# Patient Record
Sex: MALE | Race: WHITE | NOT HISPANIC OR LATINO | Employment: FULL TIME | URBAN - METROPOLITAN AREA
[De-identification: names, ages, dates, MRNs, and addresses within clinical notes are randomized per-mention and may not be internally consistent; named-entity substitution may affect disease eponyms.]

---

## 2023-02-13 DIAGNOSIS — Q66.50 CONGENITAL PES PLANUS, UNSPECIFIED LATERALITY: Primary | ICD-10-CM

## 2023-02-16 ENCOUNTER — OFFICE VISIT (OUTPATIENT)
Dept: PODIATRY | Facility: CLINIC | Age: 38
End: 2023-02-16
Payer: COMMERCIAL

## 2023-02-16 DIAGNOSIS — E66.01 MORBID OBESITY: ICD-10-CM

## 2023-02-16 DIAGNOSIS — S93.412S SPRAIN OF CALCANEOFIBULAR LIGAMENT OF LEFT ANKLE, SEQUELA: ICD-10-CM

## 2023-02-16 DIAGNOSIS — S93.492S SPRAIN OF ANTERIOR TALOFIBULAR LIGAMENT OF LEFT ANKLE, SEQUELA: ICD-10-CM

## 2023-02-16 DIAGNOSIS — M25.572 PAIN IN JOINT INVOLVING LEFT ANKLE AND FOOT: ICD-10-CM

## 2023-02-16 DIAGNOSIS — M95.8 OSTEOCHONDRAL DEFECT OF TALUS: Primary | ICD-10-CM

## 2023-02-16 DIAGNOSIS — S93.432S ANKLE SYNDESMOSIS DISRUPTION, LEFT, SEQUELA: ICD-10-CM

## 2023-02-16 PROCEDURE — 99204 PR OFFICE/OUTPT VISIT, NEW, LEVL IV, 45-59 MIN: ICD-10-PCS | Mod: S$GLB,,, | Performed by: PODIATRIST

## 2023-02-16 PROCEDURE — 99204 OFFICE O/P NEW MOD 45 MIN: CPT | Mod: S$GLB,,, | Performed by: PODIATRIST

## 2023-02-16 PROCEDURE — 99999 PR PBB SHADOW E&M-EST. PATIENT-LVL II: CPT | Mod: PBBFAC,,, | Performed by: PODIATRIST

## 2023-02-16 PROCEDURE — 99999 PR PBB SHADOW E&M-EST. PATIENT-LVL II: ICD-10-PCS | Mod: PBBFAC,,, | Performed by: PODIATRIST

## 2023-02-16 RX ORDER — DICLOFENAC SODIUM 75 MG/1
75 TABLET, DELAYED RELEASE ORAL 2 TIMES DAILY
Qty: 60 TABLET | Refills: 1 | Status: SHIPPED | OUTPATIENT
Start: 2023-02-16 | End: 2023-03-18

## 2023-02-16 NOTE — PROGRESS NOTES
Subjective:       Patient ID: Claudio Byrd is a 37 y.o. male.    Chief Complaint: Foot Pain and Ankle Pain      HPI: Claudio Byrd presents to the clinic today with the complaint of moderate pains to the right ankle, anterior and medial aspects and lateral aspect. Patient does not state recent trauma, but does have a history of recurrent inversion sprains. Pains are rated at approx. 7/10. Pains are described as sharp and achy in nature. Walking, standing and prolonged weight bearing activities do exacerbate the symptoms. Patient states prior radiographic evaluation (XR and MRI) by a MD//DPM in [place]. States likely OCD lesion. The patient has been evaluated prior by a medical profession. NSAIDs have not been taken for the symptomology. Pains have been present several months. Wife is present.       Review of patient's allergies indicates:  No Known Allergies    No past medical history on file.    No family history on file.    Social History     Socioeconomic History    Marital status:        No past surgical history on file.    Review of Systems   Constitutional:  Negative for chills, fatigue and fever.   HENT:  Negative for hearing loss.    Eyes:  Negative for photophobia and visual disturbance.   Respiratory:  Negative for cough, chest tightness, shortness of breath and wheezing.    Cardiovascular:  Negative for chest pain and palpitations.   Gastrointestinal:  Negative for constipation, diarrhea, nausea and vomiting.   Endocrine: Negative for cold intolerance and heat intolerance.   Genitourinary:  Negative for flank pain.   Musculoskeletal:  Positive for gait problem. Negative for neck pain and neck stiffness.   Neurological:  Negative for light-headedness and headaches.   Psychiatric/Behavioral:  Negative for sleep disturbance.        Objective:   There were no vitals taken for this visit.    No image results found.      Physical Exam  LOWER EXTREMITY PHYSICAL  EXAMINATION    DERMATOLOGY: Skin is supple, dry and intact.     ORTHOPEDIC: There is no tenderness to palpation of the medial malleolus. There is severe tenderness to palpation of the lateral malleolus. There is moderate tenderness to palpation of the ATFL. There is moderate pain to palpation of the CFL. There is mild pain to palpation of the PFL. There is mild tenderness to palpation of the deltoid ligament complex. Compression of the tibia and fibula at the mid-calf does not produce pains at the distal ankle articulation to suggestion high ankle sprain. There is no tenderness to palpation of the 5th met. base. There is no tenderness to palpation of the navicular tuberosity. There is mild to palpation along the course of the PB and PL tendons. There is mild edema noted to this area. There is mild retro-malleolar edema.  No subluxing peroneals are noted. There is no tenderness to palpation of the course of the posterior tibial tendon. There is no edema noted along its course. There is moderate tenderness to palpation of the anterior lateral ankle gutter. There is moderate tenderness to palpation of the anterior medial ankle gutter. Anterior Drawer Testing is WNL. Stress valgus and varus ankle testing is WNL. There is no pain to the area of the sinus tarsi. Pes Planus  foot type is noted. No palpable foot or ankle fractures are noted. MMT is painful with inversion and eversion with and without resistance and decreased as compared to the contra-lateral. MMT is decreased on the right as compared to the contra-lateral. Patient is able to bear weight. Gait pattern is antalgic.      Assessment:     1. Osteochondral defect of talus    2. Pain in joint involving left ankle and foot    3. Sprain of anterior talofibular ligament of left ankle, sequela    4. Sprain of calcaneofibular ligament of left ankle, sequela    5. Ankle syndesmosis disruption, left, sequela    6. Morbid obesity          Plan:     Osteochondral defect of  talus  -     Ambulatory referral/consult to Podiatry    Pain in joint involving left ankle and foot  -     diclofenac (VOLTAREN) 75 MG EC tablet; Take 1 tablet (75 mg total) by mouth 2 (two) times daily.  Dispense: 60 tablet; Refill: 1    Sprain of anterior talofibular ligament of left ankle, sequela    Sprain of calcaneofibular ligament of left ankle, sequela    Ankle syndesmosis disruption, left, sequela    Morbid obesity      Thorough discussion is had with the patient today, concerning the diagnosis, its etiology, and the treatment algorithm at present.     Likely OCD with ATFL and CFL and AITFL pathology given BMI and Hx of inversion sprains w/o PT/OT.    Patient is counseled and reminded concerning the importance of good nutrition and healthy eating habits, which may include blood sugar control, to prevent and/or help podiatric foot and ankle complications.    CSI vs PO NSAIDs for now.    Will need to repeat MRI and obtain CT for pre-op when the time comes.    Lateral ankle complex is severely loosened due to recurrent injury. Given his BMI and age, Sx is best course of action.    The procedure of (repair of OCD lesion with ATFL +/- CFL repair with AITFL repair) was thoroughly explained to the patient. Its necessity and/or purpose and the implications therein were outlined, including any pertinent advantages and/or disadvantages, and possible complications, if any. Possible complications include recurrence of pathology and/or deformity, infection (cellulitis, drainage, purulence, malodor, etc...), pain, numbness, neuritis, edema, burning, loss of function, need for further surgery, possible need for removal of any implanted hardware, soft tissue contracture and/or scarring, etc... No guarantees were given and/or implied. Post-operative expectations and weightbearing protocol is thoroughly explained to the patient, who acknowledges understanding.         Future Appointments   Date Time Provider Department  [place]   2/27/2023  4:00 PM Siddharth Son MD [facility]

## 2024-08-31 ENCOUNTER — HOSPITAL ENCOUNTER (EMERGENCY)
Facility: HOSPITAL | Age: 39
Discharge: HOME | End: 2024-09-01
Attending: EMERGENCY MEDICINE
Payer: COMMERCIAL

## 2024-08-31 DIAGNOSIS — N45.1 RIGHT EPIDIDYMITIS: Primary | ICD-10-CM

## 2024-08-31 DIAGNOSIS — K76.0 FATTY LIVER: ICD-10-CM

## 2024-08-31 DIAGNOSIS — N43.3 RIGHT HYDROCELE: ICD-10-CM

## 2024-08-31 DIAGNOSIS — N45.2 ORCHITIS OF RIGHT TESTICLE: ICD-10-CM

## 2024-08-31 DIAGNOSIS — R50.9 FEVER: ICD-10-CM

## 2024-08-31 DIAGNOSIS — N20.0 KIDNEY STONE ON LEFT SIDE: ICD-10-CM

## 2024-08-31 DIAGNOSIS — N50.819 TESTICULAR PAIN: ICD-10-CM

## 2024-08-31 DIAGNOSIS — N30.90 CYSTITIS: ICD-10-CM

## 2024-08-31 LAB
ALBUMIN SERPL BCP-MCNC: 3.2 G/DL (ref 3.5–5.2)
ALP SERPL-CCNC: 57 U/L (ref 55–135)
ALT SERPL W/O P-5'-P-CCNC: 52 U/L (ref 10–44)
ANION GAP SERPL CALC-SCNC: 14 MMOL/L (ref 8–16)
AST SERPL-CCNC: 30 U/L (ref 10–40)
BILIRUB SERPL-MCNC: 0.5 MG/DL (ref 0.1–1)
BUN SERPL-MCNC: 17 MG/DL (ref 6–20)
CALCIUM SERPL-MCNC: 9.6 MG/DL (ref 8.7–10.5)
CHLORIDE SERPL-SCNC: 105 MMOL/L (ref 95–110)
CO2 SERPL-SCNC: 19 MMOL/L (ref 23–29)
CREAT SERPL-MCNC: 1.2 MG/DL (ref 0.5–1.4)
EST. GFR  (NO RACE VARIABLE): >60 ML/MIN/1.73 M^2
GLUCOSE SERPL-MCNC: 95 MG/DL (ref 70–110)
LACTATE SERPL-SCNC: 1.4 MMOL/L (ref 0.5–2.2)
MAGNESIUM SERPL-MCNC: 1.8 MG/DL (ref 1.6–2.6)
POTASSIUM SERPL-SCNC: 3.9 MMOL/L (ref 3.5–5.1)
PROT SERPL-MCNC: 7.1 G/DL (ref 6–8.4)
SARS-COV-2 RDRP RESP QL NAA+PROBE: NEGATIVE
SODIUM SERPL-SCNC: 138 MMOL/L (ref 136–145)
TROPONIN I SERPL DL<=0.01 NG/ML-MCNC: <0.006 NG/ML (ref 0–0.03)

## 2024-08-31 PROCEDURE — 85007 BL SMEAR W/DIFF WBC COUNT: CPT | Performed by: EMERGENCY MEDICINE

## 2024-08-31 PROCEDURE — 93005 ELECTROCARDIOGRAM TRACING: CPT

## 2024-08-31 PROCEDURE — U0002 COVID-19 LAB TEST NON-CDC: HCPCS | Performed by: EMERGENCY MEDICINE

## 2024-08-31 PROCEDURE — 93010 ELECTROCARDIOGRAM REPORT: CPT | Mod: ,,, | Performed by: INTERNAL MEDICINE

## 2024-08-31 PROCEDURE — 84484 ASSAY OF TROPONIN QUANT: CPT | Performed by: EMERGENCY MEDICINE

## 2024-08-31 PROCEDURE — 80053 COMPREHEN METABOLIC PANEL: CPT | Performed by: EMERGENCY MEDICINE

## 2024-08-31 PROCEDURE — 83735 ASSAY OF MAGNESIUM: CPT | Performed by: EMERGENCY MEDICINE

## 2024-08-31 PROCEDURE — 99285 EMERGENCY DEPT VISIT HI MDM: CPT | Mod: 25

## 2024-08-31 PROCEDURE — 84145 PROCALCITONIN (PCT): CPT | Performed by: EMERGENCY MEDICINE

## 2024-08-31 PROCEDURE — 83605 ASSAY OF LACTIC ACID: CPT | Performed by: EMERGENCY MEDICINE

## 2024-08-31 PROCEDURE — 85027 COMPLETE CBC AUTOMATED: CPT | Performed by: EMERGENCY MEDICINE

## 2024-08-31 PROCEDURE — 87040 BLOOD CULTURE FOR BACTERIA: CPT | Mod: 59 | Performed by: EMERGENCY MEDICINE

## 2024-09-01 VITALS
TEMPERATURE: 98 F | OXYGEN SATURATION: 99 % | HEART RATE: 83 BPM | DIASTOLIC BLOOD PRESSURE: 92 MMHG | RESPIRATION RATE: 20 BRPM | SYSTOLIC BLOOD PRESSURE: 138 MMHG | BODY MASS INDEX: 41.67 KG/M2 | WEIGHT: 297.63 LBS | HEIGHT: 71 IN

## 2024-09-01 LAB
BACTERIA #/AREA URNS HPF: NORMAL /HPF
BASOPHILS # BLD AUTO: ABNORMAL K/UL (ref 0–0.2)
BASOPHILS NFR BLD: 0 % (ref 0–1.9)
BILIRUB UR QL STRIP: NEGATIVE
CLARITY UR: CLEAR
COLOR UR: YELLOW
DIFFERENTIAL METHOD BLD: ABNORMAL
EOSINOPHIL # BLD AUTO: ABNORMAL K/UL (ref 0–0.5)
EOSINOPHIL NFR BLD: 1 % (ref 0–8)
ERYTHROCYTE [DISTWIDTH] IN BLOOD BY AUTOMATED COUNT: 11.9 % (ref 11.5–14.5)
GLUCOSE UR QL STRIP: NEGATIVE
HCT VFR BLD AUTO: 45.6 % (ref 40–54)
HETEROPH AB SERPL QL IA: NEGATIVE
HGB BLD-MCNC: 15.6 G/DL (ref 14–18)
HGB UR QL STRIP: ABNORMAL
HYALINE CASTS #/AREA URNS LPF: 0 /LPF
IMM GRANULOCYTES # BLD AUTO: ABNORMAL K/UL (ref 0–0.04)
IMM GRANULOCYTES NFR BLD AUTO: ABNORMAL % (ref 0–0.5)
KETONES UR QL STRIP: NEGATIVE
LACTATE SERPL-SCNC: 1 MMOL/L (ref 0.5–2.2)
LEUKOCYTE ESTERASE UR QL STRIP: NEGATIVE
LYMPHOCYTES # BLD AUTO: ABNORMAL K/UL (ref 1–4.8)
LYMPHOCYTES NFR BLD: 18 % (ref 18–48)
MCH RBC QN AUTO: 29.8 PG (ref 27–31)
MCHC RBC AUTO-ENTMCNC: 34.2 G/DL (ref 32–36)
MCV RBC AUTO: 87 FL (ref 82–98)
MICROSCOPIC COMMENT: NORMAL
MONOCYTES # BLD AUTO: ABNORMAL K/UL (ref 0.3–1)
MONOCYTES NFR BLD: 10 % (ref 4–15)
MYELOCYTES NFR BLD MANUAL: 3 %
NEUTROPHILS NFR BLD: 68 % (ref 38–73)
NITRITE UR QL STRIP: NEGATIVE
NRBC BLD-RTO: 0 /100 WBC
OHS QRS DURATION: 96 MS
OHS QTC CALCULATION: 426 MS
PH UR STRIP: 6 [PH] (ref 5–8)
PLATELET # BLD AUTO: 251 K/UL (ref 150–450)
PLATELET BLD QL SMEAR: ABNORMAL
PMV BLD AUTO: 8.6 FL (ref 9.2–12.9)
PROCALCITONIN SERPL IA-MCNC: 0.06 NG/ML
PROT UR QL STRIP: ABNORMAL
RBC # BLD AUTO: 5.24 M/UL (ref 4.6–6.2)
RBC #/AREA URNS HPF: 0 /HPF (ref 0–4)
SP GR UR STRIP: 1.03 (ref 1–1.03)
URN SPEC COLLECT METH UR: ABNORMAL
UROBILINOGEN UR STRIP-ACNC: NEGATIVE EU/DL
WBC # BLD AUTO: 10.44 K/UL (ref 3.9–12.7)
WBC #/AREA URNS HPF: 0 /HPF (ref 0–5)

## 2024-09-01 PROCEDURE — 87491 CHLMYD TRACH DNA AMP PROBE: CPT | Performed by: EMERGENCY MEDICINE

## 2024-09-01 PROCEDURE — 25000003 PHARM REV CODE 250: Performed by: EMERGENCY MEDICINE

## 2024-09-01 PROCEDURE — 96365 THER/PROPH/DIAG IV INF INIT: CPT | Mod: 59

## 2024-09-01 PROCEDURE — 81000 URINALYSIS NONAUTO W/SCOPE: CPT | Performed by: EMERGENCY MEDICINE

## 2024-09-01 PROCEDURE — 25500020 PHARM REV CODE 255: Performed by: EMERGENCY MEDICINE

## 2024-09-01 PROCEDURE — 63600175 PHARM REV CODE 636 W HCPCS: Performed by: EMERGENCY MEDICINE

## 2024-09-01 PROCEDURE — 83605 ASSAY OF LACTIC ACID: CPT | Performed by: EMERGENCY MEDICINE

## 2024-09-01 PROCEDURE — 63700000 PHARM REV CODE 250 ALT 637 W/O HCPCS: Performed by: EMERGENCY MEDICINE

## 2024-09-01 PROCEDURE — 96367 TX/PROPH/DG ADDL SEQ IV INF: CPT

## 2024-09-01 PROCEDURE — 86308 HETEROPHILE ANTIBODY SCREEN: CPT | Performed by: EMERGENCY MEDICINE

## 2024-09-01 PROCEDURE — 87591 N.GONORRHOEAE DNA AMP PROB: CPT | Performed by: EMERGENCY MEDICINE

## 2024-09-01 RX ORDER — LEVOFLOXACIN 5 MG/ML
500 INJECTION, SOLUTION INTRAVENOUS ONCE
Status: COMPLETED | OUTPATIENT
Start: 2024-09-01 | End: 2024-09-01

## 2024-09-01 RX ORDER — AZITHROMYCIN 250 MG/1
1000 TABLET, FILM COATED ORAL
Status: COMPLETED | OUTPATIENT
Start: 2024-09-01 | End: 2024-09-01

## 2024-09-01 RX ORDER — LEVOFLOXACIN 500 MG/1
500 TABLET, FILM COATED ORAL DAILY
Qty: 10 TABLET | Refills: 0 | Status: SHIPPED | OUTPATIENT
Start: 2024-09-01 | End: 2024-09-11

## 2024-09-01 RX ORDER — KETOROLAC TROMETHAMINE 10 MG/1
10 TABLET, FILM COATED ORAL EVERY 6 HOURS
Qty: 20 TABLET | Refills: 0 | Status: SHIPPED | OUTPATIENT
Start: 2024-09-01 | End: 2024-09-06

## 2024-09-01 RX ORDER — HYDROCODONE BITARTRATE AND ACETAMINOPHEN 5; 325 MG/1; MG/1
1 TABLET ORAL EVERY 4 HOURS PRN
Qty: 12 TABLET | Refills: 0 | Status: SHIPPED | OUTPATIENT
Start: 2024-09-01

## 2024-09-01 RX ADMIN — IOHEXOL 100 ML: 350 INJECTION, SOLUTION INTRAVENOUS at 12:09

## 2024-09-01 RX ADMIN — LEVOFLOXACIN 500 MG: 500 INJECTION, SOLUTION INTRAVENOUS at 02:09

## 2024-09-01 RX ADMIN — AZITHROMYCIN DIHYDRATE 1000 MG: 250 TABLET ORAL at 01:09

## 2024-09-01 RX ADMIN — CEFTRIAXONE 1 G: 1 INJECTION, POWDER, FOR SOLUTION INTRAMUSCULAR; INTRAVENOUS at 01:09

## 2024-09-01 RX ADMIN — SODIUM CHLORIDE 1000 ML: 9 INJECTION, SOLUTION INTRAVENOUS at 02:09

## 2024-09-01 NOTE — ED PROVIDER NOTES
SCRIBE #1 NOTE: IZeenat, am scribing for, and in the presence of, Yolanda Villar DO. I have scribed the HPI, ROS, and PEx.     SCRIBE #2 NOTE: I, Judy Arellano, am scribing for, and in the presence of,  Leonard Kraus MD. I have scribed the remaining portions of the note not scribed by Scribe #1.      History     Chief Complaint   Patient presents with    Groin Pain     Pt reports swelling, pain, and tenderness to left testicle 2 days ago that resolved, then right testicle started swelling with pain and tenderness yesterday. Fever at home up to 103 PTA.  Last oxycodone and ibuprofen 1.5 hours PTA. Pt denies burning, pain, and difficulty urinating.      Review of patient's allergies indicates:  No Known Allergies      History of Present Illness     HPI    8/31/2024, 10:29 PM  History obtained from the patient      History of Present Illness: Claudio Byrd is a 38 y.o. male patient who presents to the Emergency Department for evaluation of testicular pain which onset 3 days ago. Pt reports he was experiencing left testicle swelling and pain 3 days ago. Those sxs were resolved. Yesterday, pt reports his right testicle swelling and pain. Symptoms are constant and moderate in severity. No mitigating or exacerbating factors reported. Associated sxs include 103 fever and diaphoresis.  He says the pain radiates to his abdomen.  Patient denies any N/V, diarrhea, constipation, flank pain, dysuria, hematuria, frequency, difficulty urinating, and all other sxs at this time.  Patient reports recently being diagnosed with hand, foot, and mouth disease.  Prior Tx includes ibuprofen and oxycodone. No further complaints or concerns at this time.       Arrival mode: Personal vehicle      PCP: Bridgett, Primary Doctor        Past Medical History:  History reviewed. No pertinent past medical history.    Past Surgical History:  History reviewed. No pertinent surgical history.      Family History:  No family history  on file.    Social History:  Social History     Tobacco Use    Smoking status: Never    Smokeless tobacco: Not on file   Substance and Sexual Activity    Alcohol use: Not on file    Drug use: Not on file    Sexual activity: Not on file        Review of Systems     Review of Systems   Constitutional:  Positive for diaphoresis and fever.   Gastrointestinal:  Positive for abdominal pain. Negative for constipation, diarrhea, nausea and vomiting.   Genitourinary:  Positive for testicular pain. Negative for difficulty urinating, dysuria, flank pain, frequency and hematuria.        (+) Testicular swelling        Physical Exam     Initial Vitals [08/31/24 2157]   BP Pulse Resp Temp SpO2   (!) 151/77 99 16 100.3 °F (37.9 °C) 97 %      MAP       --          Physical Exam  Nursing Notes and Vital Signs Reviewed.  Constitutional: Patient is in no acute distress. Well-developed and well-nourished. Diaphoretic.  Head: Atraumatic. Normocephalic.  Eyes: PERRL. EOM intact. Conjunctivae are not pale. No scleral icterus.  ENT: Mucous membranes are moist. Oropharynx is clear and symmetric.    Neck: Supple. Full ROM. No lymphadenopathy.  Cardiovascular: Tachycardic. Regular rhythm. No murmurs, rubs, or gallops. Distal pulses are 2+ and symmetric.  Pulmonary/Chest: No respiratory distress. Clear to auscultation bilaterally. No wheezing or rales.  Abdominal: Soft and non-distended.  There is no tenderness.  No rebound, guarding, or rigidity. Good bowel sounds.  : Male chaperone was present. No CVA tenderness. External inspection is normal. Cremasteric reflexes are intact bilaterally. No erythema, rash, or lesions. No penile discharge. Normal bilateral testicular lie and position. Scrotum and testes appear normal with no discoloration. Right epididymal tenderness. No masses or hernias around the scrotum, testicles, or inguinal canal.   Musculoskeletal: Moves all extremities. No obvious deformities. No edema. No calf tenderness.  Skin:  "Warm and dry.  Neurological:  Alert, awake, and appropriate.  Normal speech.  No acute focal neurological deficits are appreciated.  Psychiatric: Normal affect. Good eye contact. Appropriate in content.     ED Course   Procedures  ED Vital Signs:  Vitals:    08/31/24 2157 08/31/24 2225 08/31/24 2230 08/31/24 2300   BP: (!) 151/77  (!) 141/89 (!) 133/91   Pulse: 99 85 91 81   Resp: 16  19 18   Temp: 100.3 °F (37.9 °C)  99.5 °F (37.5 °C)    TempSrc: Oral  Oral    SpO2: 97%  97% 96%   Weight: 135 kg (297 lb 9.9 oz)      Height: 5' 11" (1.803 m)       08/31/24 2330 09/01/24 0000 09/01/24 0030 09/01/24 0100   BP: 128/89 129/85 131/73 (!) 149/84   Pulse: 89 79 79 86   Resp: 17 18 18 17   Temp:       TempSrc:       SpO2: 97% 98% 98% 97%   Weight:       Height:        09/01/24 0130 09/01/24 0200 09/01/24 0230 09/01/24 0300   BP: (!) 145/87 130/84 124/88 (!) 137/94   Pulse: 79 75 73 80   Resp: 16 16 16 17   Temp: 98.4 °F (36.9 °C)      TempSrc: Oral      SpO2: 99% 98% 96% 100%   Weight:       Height:        09/01/24 0330 09/01/24 0353   BP: (!) 140/90 (!) 138/92   Pulse: 79 83   Resp: 15 20   Temp:  98.3 °F (36.8 °C)   TempSrc:     SpO2: 97% 99%   Weight:     Height:         Abnormal Lab Results:  Labs Reviewed   CBC W/ AUTO DIFFERENTIAL - Abnormal       Result Value    WBC 10.44      RBC 5.24      Hemoglobin 15.6      Hematocrit 45.6      MCV 87      MCH 29.8      MCHC 34.2      RDW 11.9      Platelets 251      MPV 8.6 (*)     Immature Granulocytes CANCELED      Immature Grans (Abs) CANCELED      Lymph # CANCELED      Mono # CANCELED      Eos # CANCELED      Baso # CANCELED      nRBC 0      Gran % 68.0      Lymph % 18.0      Mono % 10.0      Eosinophil % 1.0      Basophil % 0.0      Myelocytes 3.0      Platelet Estimate Appears normal      Differential Method Manual     COMPREHENSIVE METABOLIC PANEL - Abnormal    Sodium 138      Potassium 3.9      Chloride 105      CO2 19 (*)     Glucose 95      BUN 17      Creatinine 1.2   "    Calcium 9.6      Total Protein 7.1      Albumin 3.2 (*)     Total Bilirubin 0.5      Alkaline Phosphatase 57      AST 30      ALT 52 (*)     eGFR >60      Anion Gap 14     URINALYSIS, REFLEX TO URINE CULTURE - Abnormal    Specimen UA Urine, Clean Catch      Color, UA Yellow      Appearance, UA Clear      pH, UA 6.0      Specific Gravity, UA 1.030      Protein, UA 2+ (*)     Glucose, UA Negative      Ketones, UA Negative      Bilirubin (UA) Negative      Occult Blood UA Trace (*)     Nitrite, UA Negative      Urobilinogen, UA Negative      Leukocytes, UA Negative      Narrative:     Specimen Source->Urine   CULTURE, BLOOD    Blood Culture, Routine No Growth to date      Blood Culture, Routine No Growth to date      Blood Culture, Routine No Growth to date      Blood Culture, Routine No Growth to date      Narrative:     Aerobic and anaerobic   CULTURE, BLOOD    Blood Culture, Routine No Growth to date      Blood Culture, Routine No Growth to date      Blood Culture, Routine No Growth to date      Blood Culture, Routine No Growth to date      Narrative:     Aerobic and anaerobic   LACTIC ACID, PLASMA    Lactate (Lactic Acid) 1.4     TROPONIN I    Troponin I <0.006     MAGNESIUM    Magnesium 1.8     PROCALCITONIN    Procalcitonin 0.06     SARS-COV-2 RNA AMPLIFICATION, QUAL    SARS-CoV-2 RNA, Amplification, Qual Negative     URINALYSIS MICROSCOPIC    RBC, UA 0      WBC, UA 0      Bacteria None      Hyaline Casts, UA 0      Microscopic Comment SEE COMMENT      Narrative:     Specimen Source->Urine   LACTIC ACID, PLASMA    Lactate (Lactic Acid) 1.0     C. TRACHOMATIS/N. GONORRHOEAE BY AMP DNA   HETEROPHILE AB SCREEN    Monospot Negative     C. TRACHOMATIS/N. GONORRHOEAE BY AMP DNA    Chlamydia, Amplified DNA Not Detected      N gonorrhoeae, amplified DNA Not Detected      Narrative:     Specimen Source->Urine        All Lab Results:  Results for orders placed or performed during the hospital encounter of 08/31/24    Blood culture x two cultures. Draw prior to antibiotics.    Specimen: Peripheral, Antecubital, Left; Blood   Result Value Ref Range    Blood Culture, Routine No Growth to date     Blood Culture, Routine No Growth to date     Blood Culture, Routine No Growth to date     Blood Culture, Routine No Growth to date    Blood culture x two cultures. Draw prior to antibiotics.    Specimen: Peripheral, Antecubital, Right; Blood   Result Value Ref Range    Blood Culture, Routine No Growth to date     Blood Culture, Routine No Growth to date     Blood Culture, Routine No Growth to date     Blood Culture, Routine No Growth to date    CBC auto differential   Result Value Ref Range    WBC 10.44 3.90 - 12.70 K/uL    RBC 5.24 4.60 - 6.20 M/uL    Hemoglobin 15.6 14.0 - 18.0 g/dL    Hematocrit 45.6 40.0 - 54.0 %    MCV 87 82 - 98 fL    MCH 29.8 27.0 - 31.0 pg    MCHC 34.2 32.0 - 36.0 g/dL    RDW 11.9 11.5 - 14.5 %    Platelets 251 150 - 450 K/uL    MPV 8.6 (L) 9.2 - 12.9 fL    Immature Granulocytes CANCELED 0.0 - 0.5 %    Immature Grans (Abs) CANCELED 0.00 - 0.04 K/uL    Lymph # CANCELED 1.0 - 4.8 K/uL    Mono # CANCELED 0.3 - 1.0 K/uL    Eos # CANCELED 0.0 - 0.5 K/uL    Baso # CANCELED 0.00 - 0.20 K/uL    nRBC 0 0 /100 WBC    Gran % 68.0 38.0 - 73.0 %    Lymph % 18.0 18.0 - 48.0 %    Mono % 10.0 4.0 - 15.0 %    Eosinophil % 1.0 0.0 - 8.0 %    Basophil % 0.0 0.0 - 1.9 %    Myelocytes 3.0 %    Platelet Estimate Appears normal     Differential Method Manual    Comprehensive metabolic panel   Result Value Ref Range    Sodium 138 136 - 145 mmol/L    Potassium 3.9 3.5 - 5.1 mmol/L    Chloride 105 95 - 110 mmol/L    CO2 19 (L) 23 - 29 mmol/L    Glucose 95 70 - 110 mg/dL    BUN 17 6 - 20 mg/dL    Creatinine 1.2 0.5 - 1.4 mg/dL    Calcium 9.6 8.7 - 10.5 mg/dL    Total Protein 7.1 6.0 - 8.4 g/dL    Albumin 3.2 (L) 3.5 - 5.2 g/dL    Total Bilirubin 0.5 0.1 - 1.0 mg/dL    Alkaline Phosphatase 57 55 - 135 U/L    AST 30 10 - 40 U/L    ALT 52 (H)  10 - 44 U/L    eGFR >60 >60 mL/min/1.73 m^2    Anion Gap 14 8 - 16 mmol/L   Lactic acid, plasma #1   Result Value Ref Range    Lactate (Lactic Acid) 1.4 0.5 - 2.2 mmol/L   Urinalysis, Reflex to Urine Culture Urine, Clean Catch    Specimen: Urine   Result Value Ref Range    Specimen UA Urine, Clean Catch     Color, UA Yellow Yellow, Straw, Nadiya    Appearance, UA Clear Clear    pH, UA 6.0 5.0 - 8.0    Specific Gravity, UA 1.030 1.005 - 1.030    Protein, UA 2+ (A) Negative    Glucose, UA Negative Negative    Ketones, UA Negative Negative    Bilirubin (UA) Negative Negative    Occult Blood UA Trace (A) Negative    Nitrite, UA Negative Negative    Urobilinogen, UA Negative <2.0 EU/dL    Leukocytes, UA Negative Negative   Troponin I   Result Value Ref Range    Troponin I <0.006 0.000 - 0.026 ng/mL   Magnesium   Result Value Ref Range    Magnesium 1.8 1.6 - 2.6 mg/dL   Procalcitonin   Result Value Ref Range    Procalcitonin 0.06 <0.25 ng/mL   COVID-19 Rapid Screening   Result Value Ref Range    SARS-CoV-2 RNA, Amplification, Qual Negative Negative   Urinalysis Microscopic   Result Value Ref Range    RBC, UA 0 0 - 4 /hpf    WBC, UA 0 0 - 5 /hpf    Bacteria None None-Occ /hpf    Hyaline Casts, UA 0 0-1/lpf /lpf    Microscopic Comment SEE COMMENT    Lactic acid, plasma #2   Result Value Ref Range    Lactate (Lactic Acid) 1.0 0.5 - 2.2 mmol/L   Heterophile Ab Screen   Result Value Ref Range    Monospot Negative Negative   C. trachomatis/N. gonorrhoeae by AMP DNA   Result Value Ref Range    Chlamydia, Amplified DNA Not Detected Not Detected    N gonorrhoeae, amplified DNA Not Detected Not Detected   EKG 12-lead   Result Value Ref Range    QRS Duration 96 ms    [facility] QTC Calculation 426 ms       Imaging Results:  Imaging Results              CT Abdomen Pelvis With IV Contrast NO Oral Contrast (Final result)  Result time 09/01/24 02:10:58      Final result by Marbin Jimenez MD (09/01/24 02:10:58)                    Impression:      Right kidney is absent.  4 mm obstructive stone at the left UPJ with resultant mild hydronephrosis.  Cystitis.    Fatty liver.      Electronically signed by: Marbin Jimenez  Date:    09/01/2024  Time:    02:10               Narrative:    EXAMINATION:  CT ABDOMEN PELVIS WITH IV CONTRAST    CLINICAL HISTORY:  Abdominal pain, acute, nonlocalized;Lymphadenopathy, groin;    TECHNIQUE:  Low dose axial images, sagittal and coronal reformations were obtained from the lung bases to the pubic symphysis following the IV administration of 100 mL of Omnipaque 350 .  Oral contrast was not administered.    COMPARISON:  None.    FINDINGS:  Abdomen:    - Lower thorax:Negative    - Lung bases: No infiltrates and no nodules.    - Liver: Fatty    - Gallbladder: No calcified gallstones.    - Bile Ducts: No evidence of intra or extra hepatic biliary ductal dilation.    - Spleen: Negative.    - Kidneys: Right kidney is absent.  4 mm obstructive stone at the left UPJ with resultant mild hydronephrosis.    - Adrenals: Unremarkable.    - Pancreas: No mass or peripancreatic fat stranding.    - Retroperitoneum:  No significant adenopathy.    - Vascular: No abdominal aortic aneurysm.    - Abdominal wall:  Fat containing umbilical hernia.    Pelvis:    No pelvic mass, adenopathy, or free fluid.  Cystitis.    Bowel/Mesentery:    No evidence of bowel obstruction or inflammation.    Bones:  No acute osseous abnormality and no suspicious lytic or blastic lesion.                                       US Scrotum And Testicles (Final result)  Result time 09/01/24 01:29:53      Final result by Marbin Jimenez MD (09/01/24 01:29:53)                   Impression:      Right-sided epididymo-orchitis.  Large complex hydrocele, likely reactive      Electronically signed by: Marbin Jimenez  Date:    09/01/2024  Time:    01:29               Narrative:    EXAMINATION:  US SCROTUM AND TESTICLES    CLINICAL HISTORY:  Testicular  pain, unspecified    TECHNIQUE:  Sonography of the scrotum and testes.    COMPARISON:  None.    FINDINGS:  Right Testicle:    *Size: 5.8 x 3.2 x 4.3 cm  *Appearance: Normal.  *Flow: Increased vascularity  *Epididymis: Mildly enlarged with increased vascularity.  *Hydrocele: Large complex hydrocele  *Varicocele: None.  .    Left Testicle:    *Size: 5 x 2.9 x 3.3 cm  *Appearance: Normal.  *Flow: Normal arterial and venous flow  *Epididymis: Normal.  *Hydrocele: None.  *Varicocele: None.  .    Other findings: None.                                       X-Ray Chest AP Portable (Final result)  Result time 08/31/24 23:15:20      Final result by Eugene Quiñonez MD (08/31/24 23:15:20)                   Impression:      No acute abnormality.      Electronically signed by: Eugene Quiñonez  Date:    08/31/2024  Time:    23:15               Narrative:    EXAMINATION:  XR CHEST AP PORTABLE    CLINICAL HISTORY:  Sepsis;    TECHNIQUE:  Single frontal view of the chest was performed.    COMPARISON:  None    FINDINGS:  The lungs are clear, with normal appearance of pulmonary vasculature and no pleural effusion or pneumothorax.    The cardiac silhouette is normal in size. The hilar and mediastinal contours are unremarkable.    Bones are intact.                                       The EKG was ordered, reviewed, and independently interpreted by the ED provider.  Interpretation time: 22:45  Rate: 86 BPM  Rhythm: normal sinus rhythm  Interpretation: Nonspecific ST abnormality. No STEMI.           The Emergency Provider reviewed the vital signs and test results, which are outlined above.     ED Discussion     2:00 AM: Dr. Villar transfers care of patient to Dr. Kraus pending lab and radiology results.    3:15 AM: Reassessed pt at this time. Discussed with pt all pertinent ED information and results. Discussed pt dx and plan of tx. Gave pt all f/u and return to the ED instructions. All questions and concerns were addressed at this time. Pt  expresses understanding of information and instructions, and is comfortable with plan to discharge. Pt is stable for discharge.    I discussed with patient and/or family/caretaker that evaluation in the ED does not suggest any emergent or life threatening medical conditions requiring immediate intervention beyond what was provided in the ED, and I believe patient is safe for discharge.  Regardless, an unremarkable evaluation in the ED does not preclude the development or presence of a serious of life threatening condition. As such, patient was instructed to return immediately for any worsening or change in current symptoms.     ED Course as of 09/04/24 1801   Sun Sep 01, 2024   0116 38-year-old male presents with right testicular pain.  Physical exam unremarkable other than patient has a low-grade fever at 100.3 ° F and is diaphoretic.  However CBC shows no leukocytosis or left shift.  CMP shows a very minimal increase in ALT otherwise electrolytes and renal function.  Lactic acid and procalcitonin are both normal.  EKG and troponin show no cardiac ischemia.  UA negative for infection.  COVID is negative.  Chest x-ray negative for pneumonia.  Testicular/scrotal ultrasound shows right epididymo-orchitis with a large reactive complex hydrocele.    Ddx:  Hydrocele, varicocele, epididymitis, orchitis, testicular torsion, hernia, appendicitis, cholecystitis, sepsis, kidney stone, infection, GC, chlamydia, mono [NF]   0206 Monospot, CT abdomen and pelvis are pending, as well as GC, chlamydia, and blood cultures. [NF]   0233 Mono, Immunochomatopraphic IM Heterophile Antibody: Negative [MC]   0311 Creatinine: 1.2 [MC]      ED Course User Index  [MC] Leonard Kraus MD  [NF] Yolanda Villar, DO     Medical Decision Making  CT abdomen and pelvis with IV contrast: Right kidney is absent.  4 mm obstructive stone at the left UPJ with resultant mild hydronephrosis.  Cystitis. Fatty liver.       Amount and/or Complexity of  Data Reviewed  Labs: ordered. Decision-making details documented in ED Course.  Radiology: ordered. Decision-making details documented in ED Course.  ECG/medicine tests: ordered and independent interpretation performed. Decision-making details documented in ED Course.    Risk  Prescription drug management.                ED Medication(s):  Medications   cefTRIAXone (Rocephin) 1 g in D5W 100 mL IVPB (MB+) (0 g Intravenous Stopped 9/1/24 0156)   azithromycin tablet 1,000 mg (1,000 mg Oral Given 9/1/24 0126)   iohexoL (OMNIPAQUE 350) injection 100 mL (100 mLs Intravenous Given 9/1/24 0053)   sodium chloride 0.9% bolus 1,000 mL 1,000 mL (0 mLs Intravenous Stopped 9/1/24 0307)   levoFLOXacin 500 mg/100 mL IVPB 500 mg (0 mg Intravenous Stopped 9/1/24 0316)       Discharge Medication List as of 9/1/2024  3:12 AM        START taking these medications    Details   HYDROcodone-acetaminophen (NORCO) 5-325 mg per tablet Take 1 tablet by mouth every 4 (four) hours as needed for Pain., Starting Sun 9/1/2024, Print      ketorolac (TORADOL) 10 mg tablet Take 1 tablet (10 mg total) by mouth every 6 (six) hours. for 5 days, Starting Sun 9/1/2024, Until Fri 9/6/2024, Normal      levoFLOXacin (LEVAQUIN) 500 MG tablet Take 1 tablet (500 mg total) by mouth once daily. for 10 days, Starting Sun 9/1/2024, Until Wed 9/11/2024, Print              Follow-up Information       [facility] Emergency Dept[facility]    Specialty: Emergency Medicine  Why: As needed, If symptoms worsen  Contact information:  [address] [facility] [address] [phone]             Geovani Major FNP-C On 9/3/2024.    Specialty: Family Medicine  Contact information:  [address]  [facility] [address] [phone]                                 Scribe Attestation:   Scribe #1: I performed the above scribed service and the documentation accurately describes the services I performed. I attest to the accuracy of the note.      Attending:   Physician Attestation Statement for Scribe #1: I, Yolanda Villar DO, personally performed the services described in this documentation, as scribed by Zeenat Clement, in my presence, and it is both accurate and complete.       Scribe Attestation:   Scribe #2: I performed the above scribed service and the documentation accurately describes the services I performed. I attest to the accuracy of the note.    Attending Attestation:           Physician Attestation for Scribe:    Physician Attestation Statement for Scribe #2: I, Leonard Kraus MD, reviewed documentation, as scribed by Judy Arellano in my presence, and it is both accurate and complete. I also acknowledge and confirm the content of the note done by Scribe #1.           Clinical Impression       ICD-10-CM ICD-9-CM   1. Right epididymitis  N45.1 604.90   2. Fever  R50.9 780.60   3. Testicular pain  N50.819 608.9   4. Orchitis of right testicle  N45.2 604.90   5. Right hydrocele  N43.3 603.9   6. Kidney stone on left side  N20.0 592.0   7. Cystitis  N30.90 595.9   8. Fatty liver  K76.0 571.8       Disposition:   Disposition: Discharged  Condition: Stable        Yolanda Villar DO  09/04/24 1801

## 2024-09-01 NOTE — DISCHARGE INSTRUCTIONS
Please follow-up with your PCP. Please call on the next business day to schedule this appointment.    Please take the levaquin for possible testicular infection and the toradol and norco as needed for pain. The toradol is non-sedating, so use this first and you can also use it before work or driving. Please understand that all medications have potential side effects. Please read the package insert for all medications that we have prescribed/recommended. Please call your primary care physician or return to the ER with any questions or concerns you may have. Please take only the dosage that is prescribed.    Please understand that the Norco I have prescribed is a narcotic and can lead to respiratory depression, addiction, and death if taken in excess. Please do not take this medication prior to driving, leaving the house, or performing any fine motor tasks. Please do not hesitate to return to the ER or call us/your primary care physician/pharmacy with any questions or concerns.    Finally, we have referred you to Urology. They should call you to set up a follow-up appointment for your kidney stone.     Return with new/worsening symptoms, fevers/chills, nausea/vomiting, uncontrollable pain, or any other concerns.

## 2024-09-03 LAB
C TRACH DNA SPEC QL NAA+PROBE: NOT DETECTED
N GONORRHOEA DNA SPEC QL NAA+PROBE: NOT DETECTED

## 2024-09-06 ENCOUNTER — OFFICE VISIT (OUTPATIENT)
Dept: UROLOGY | Facility: CLINIC | Age: 39
End: 2024-09-06
Payer: COMMERCIAL

## 2024-09-06 ENCOUNTER — HOSPITAL ENCOUNTER (OUTPATIENT)
Dept: RADIOLOGY | Facility: HOSPITAL | Age: 39
Discharge: HOME | End: 2024-09-06
Attending: UROLOGY
Payer: COMMERCIAL

## 2024-09-06 VITALS
DIASTOLIC BLOOD PRESSURE: 89 MMHG | HEART RATE: 89 BPM | BODY MASS INDEX: 41.27 KG/M2 | SYSTOLIC BLOOD PRESSURE: 132 MMHG | HEIGHT: 71 IN | RESPIRATION RATE: 16 BRPM | WEIGHT: 294.75 LBS

## 2024-09-06 DIAGNOSIS — N45.1 RIGHT EPIDIDYMITIS: ICD-10-CM

## 2024-09-06 DIAGNOSIS — N43.3 RIGHT HYDROCELE: ICD-10-CM

## 2024-09-06 DIAGNOSIS — N20.0 LEFT RENAL STONE: Primary | ICD-10-CM

## 2024-09-06 DIAGNOSIS — N45.2 ORCHITIS OF RIGHT TESTICLE: ICD-10-CM

## 2024-09-06 DIAGNOSIS — N20.1 LEFT URETERAL STONE: ICD-10-CM

## 2024-09-06 LAB
BACTERIA BLD CULT: NORMAL
BACTERIA BLD CULT: NORMAL

## 2024-09-06 PROCEDURE — 74018 RADEX ABDOMEN 1 VIEW: CPT | Mod: 26,,, | Performed by: RADIOLOGY

## 2024-09-06 PROCEDURE — 74018 RADEX ABDOMEN 1 VIEW: CPT | Mod: TC

## 2024-09-06 PROCEDURE — 99999 PR PBB SHADOW E&M-EST. PATIENT-LVL V: CPT | Mod: PBBFAC,,, | Performed by: UROLOGY

## 2024-09-06 NOTE — H&P (VIEW-ONLY)
"Chief Complaint:   Encounter Diagnoses   Name Primary?    Right epididymitis     Orchitis of right testicle     Right hydrocele     Left renal stone Yes    Left ureteral stone        HPI:  HPI Claudio Byrd ann 38 y.o. male who presents with follow up from an ER visit.  He was initially diagnosed with the COVID about 10 days ago.  He also was diagnosed with hand-foot-mouth syndrome.  He eventually presented to the emergency room with the acute right testicular pain.  Evaluation in the ER revealed hyperemic right testicle with swelling and significant pain radiating into his groin.  CT abdomen and pelvis was performed and showed a 4 mm nonobstructing left renal pelvis stone.  He was also found to have a solitary left kidney.  He was not aware this prior.  He has been taking Levaquin.  He has not had any further abdominal pain or testicular pain.  He states that it all resolved in the next few days.    History:  Social History     Tobacco Use    Smoking status: Never     No past medical history on file.  No past surgical history on file.  No family history on file.    Current Outpatient Medications on File Prior to Visit   Medication Sig Dispense Refill    ketorolac (TORADOL) 10 mg tablet Take 1 tablet (10 mg total) by mouth every 6 (six) hours. for 5 days 20 tablet 0    levoFLOXacin (LEVAQUIN) 500 MG tablet Take 1 tablet (500 mg total) by mouth once daily. for 10 days 10 tablet 0    HYDROcodone-acetaminophen (NORCO) 5-325 mg per tablet Take 1 tablet by mouth every 4 (four) hours as needed for Pain. (Patient not taking: Reported on 9/6/2024) 12 tablet 0     No current facility-administered medications on file prior to visit.        Objective:     Vitals:    09/06/24 0811   BP: 132/89   BP Location: Left arm   Patient Position: Sitting   Pulse: 89   Resp: 16   Weight: 133.7 kg (294 lb 12.1 oz)   Height: 5' 11" (1.803 m)      BMI Readings from Last 1 Encounters:   09/06/24 41.11 kg/m²          Physical Exam  No " acute distress alert and oriented   Respirations even unlabored   Abdomen is obese   Right testicle with palpable hydrocele.  The testicle is palpable beyond the hydrocele and feels slightly firm.  Left testicles palpably normal.  No left flank pain no CVA tenderness    Urinalysis shows protein.    Scrotal ultrasound and CT scan were independently reviewed as well as reviewed with the patient.    Lab Results   Component Value Date    CREATININE 1.2 08/31/2024      Assessment:       1. Left renal stone    2. Right epididymitis    3. Orchitis of right testicle    4. Right hydrocele    5. Left ureteral stone        Plan:     1. Left renal stone    2. Right epididymitis    3. Orchitis of right testicle    4. Right hydrocele    5. Left ureteral stone       Orders Placed This Encounter    X-Ray Abdomen AP 1 View    Diet NPO    Case Request Operating Room: CYSTOURETEROSCOPY,WITH HOLMIUM LASER LITHOTRIPSY OF URETERAL CALCULUS      Right epididymal orchitis with reactive hydrocele.  This is improving with antibiotics.  Recommend complete antibiotic therapy.  This may have been a viral epididymo-orchitis.  As his urinalysis was clear.  He has no reason to have or symptoms of chlamydia or gonorrhea.  Incidental discovery of left renal pelvis stone in his solitary kidney.  I did discuss that the stone has a high low likelihood of causing obstruction and renal colic.  Given that he has a solitary kidney I would recommend treatment.  We discussed treatment options.  Due to his obesity I suspect ESWL have lower success rate.  We discussed risks and benefits of left ureteroscopy.  We will schedule ureteroscopic stone extraction next week.

## 2024-09-09 ENCOUNTER — TELEPHONE (OUTPATIENT)
Dept: UROLOGY | Facility: CLINIC | Age: 39
End: 2024-09-09
Payer: COMMERCIAL

## 2024-09-09 NOTE — TELEPHONE ENCOUNTER
----- Message from Kassandra Ojeda sent at 9/9/2024  4:32 PM CDT -----  Appointment Request    Name of Caller:OPHELIA ROBLERO [[id number]]  Best Call Back Number:[phone]  Additional Information: Pt would like to cancel. He will call back another time to reschedule.

## 2024-09-19 ENCOUNTER — TELEPHONE (OUTPATIENT)
Dept: UROLOGY | Facility: CLINIC | Age: 39
End: 2024-09-19
Payer: COMMERCIAL

## 2024-09-19 NOTE — TELEPHONE ENCOUNTER
Called the patient, after verification of name and , the patient was informed that I will speak to Dr Varela and get new sx dates and let him know. Pt said he can do the sx whenever Dr Varela has time. Pt informed that I will give him a call as soon as I find out a new date. He voiced understanding         ----- Message from Beth Hedrick sent at 2024  9:19 AM CDT -----  Contact: Patient, [phone]  Calling to reschedule the procedure. Please call him. Thanks.

## 2024-09-30 ENCOUNTER — TELEPHONE (OUTPATIENT)
Dept: PREADMISSION TESTING | Facility: HOSPITAL | Age: 39
End: 2024-09-30
Payer: COMMERCIAL

## 2024-09-30 ENCOUNTER — PATIENT MESSAGE (OUTPATIENT)
Dept: PREADMISSION TESTING | Facility: HOSPITAL | Age: 39
End: 2024-09-30
Payer: COMMERCIAL

## 2024-09-30 NOTE — TELEPHONE ENCOUNTER
Pre op instructions reviewed with pt over telephone, verbalized understanding.    To confirm, Surgery is scheduled on 10/2/24. We will call you after 2pm the day before Surgery with your arrival time.    *Please report to the [facility] Lobby (1st Floor) located off of Critical access hospital (2nd Entrance/Building on the left, in front of the flag pole).  Address: [address] [place] [address] [place] [address]      INSTRUCTIONS IMPORTANT!!!  DO NOT Eat, Drink, or Smoke after 12 midnight unless instructed otherwise by your Surgeon. OK to brush teeth, no gum, candy or mints!    >>>MEDICATION INSTRUCTIONS<<<: Morning of Surgery, take small sip of water with ONLY these medications:  None       *Diabetic/ Prediabetic Patients: !!!If you take diabetic or weight loss medication, Do NOT take morning of surgery unless instructed by Doctor!!!  Metformin to be stopped 24 hrs prior to surgery.   Long Acting Insulin Instructions: HOLD the night before surgery unless instructed differently by Provider!  Ozempic/ Mounjaro/ Wegovy/ Trulicity/ Semaglutide injections or weight loss medication to be stopped 7 days prior to surgery.    !!!STOP ALL Aspirins, NSAIDS, WEIGHT LOSS INJECTIONS/PILLS, Herbal supplements, & Vitamins 7 DAYS BEFORE SURGERY!!!    ____  Avoid Alcoholic beverages 3 days prior to surgery, as it can thin the blood.  ____  NO Acrylic/fake nails or nail polish worn day of surgery (specifically hand/arm & foot surgeries).  ____  NO powder, lotions, deodorants, oils or cream on body.  ____  Remove all jewelry & piercings & foreign objects before arrival & leave at home.  ____  Remove Dentures, Hearing Aids & Contact Lens prior to surgery.  ____  Bring photo ID and insurance information to hospital (Leave Valuables at Home).  ____  If going home the same day, arrange for a ride home. You will not be able to drive for 24 hrs if Anesthesia was used.   ____  Females (ages 11-60): may need to give a urine sample the morning of  surgery; please see Pre op Nurse prior to using the restroom.  ____  Males: Stop ED medications (Viagra, Cialis) 24 hrs prior to surgery.  ____  Wear clean, loose fitting clothing to allow for dressings/ bandages.      Bathing Instructions:    -Shower with anti-bacterial Soap (Hibiclens or Dial) the night before surgery and the morning of.   -Do not use Hibiclens on your face or genitals.   -Apply clean clothes after shower.  -Do not shave your face or body 2 days prior to surgery unless instructed otherwise by your Surgeon.  -Do not shave pubic hair 7 days prior to surgery (gyn pt's).    [facility] Visitor/Ride Policy:  Only 2 adults allowed in pre op/recovery area during your procedure. You MUST HAVE A RIDE HOME from a responsible adult that you know and trust. Medical Transport, Uber or Lyft can ONLY be used if patient has a responsible adult to accompany them during ride home.       *Signs and symptoms of Infection Before or After Surgery:               !!!If you experience any fever, chills, nausea/ vomiting, foul odor/ excessive drainage from surgical site, flu-like symptoms, new wounds or cuts, PLEASE CALL THE SURGEON OFFICE at [phone] or SEND MESSAGE THROUGH [facility] PORTAL!!!     *If you are running late the morning of surgery, please call the [facility] Surgery Dept @ [phone].     *Billing questions:  [phone]     Thank you,  -[facility] Pre Admit Dept.  [phone] or [phone]  M-F 7:30 am-4:00 pm (Closed Major Holidays)

## 2024-10-01 ENCOUNTER — PATIENT MESSAGE (OUTPATIENT)
Dept: PREADMISSION TESTING | Facility: HOSPITAL | Age: 39
End: 2024-10-01
Payer: COMMERCIAL

## 2024-10-01 NOTE — PRE-PROCEDURE INSTRUCTIONS
Called and spoke with pt about the following:     Please arrive to [facility] at 1120 am on 10/2/2024 for your scheduled procedure.  Address: [address] [place] [address] [place]. [address] (2nd Building on left, 1st Floor Lobby)  >>>NO eating or drinking after midnight unless instructed otherwise by your Surgeon<<<    Thank you,  -[facility] Testing Dept.  Mon-Fri 7:30 am - 4 pm [phone]

## 2024-10-02 ENCOUNTER — HOSPITAL ENCOUNTER (OUTPATIENT)
Facility: HOSPITAL | Age: 39
Discharge: HOME | End: 2024-10-02
Attending: UROLOGY | Admitting: UROLOGY
Payer: COMMERCIAL

## 2024-10-02 ENCOUNTER — ANESTHESIA (OUTPATIENT)
Dept: SURGERY | Facility: HOSPITAL | Age: 39
End: 2024-10-02
Payer: COMMERCIAL

## 2024-10-02 ENCOUNTER — ANESTHESIA EVENT (OUTPATIENT)
Dept: SURGERY | Facility: HOSPITAL | Age: 39
End: 2024-10-02
Payer: COMMERCIAL

## 2024-10-02 ENCOUNTER — TELEPHONE (OUTPATIENT)
Dept: UROLOGY | Facility: CLINIC | Age: 39
End: 2024-10-02
Payer: COMMERCIAL

## 2024-10-02 VITALS
HEART RATE: 75 BPM | TEMPERATURE: 98 F | WEIGHT: 294.44 LBS | BODY MASS INDEX: 41.22 KG/M2 | RESPIRATION RATE: 16 BRPM | HEIGHT: 71 IN | SYSTOLIC BLOOD PRESSURE: 160 MMHG | OXYGEN SATURATION: 98 % | DIASTOLIC BLOOD PRESSURE: 91 MMHG

## 2024-10-02 DIAGNOSIS — N20.0 LEFT RENAL STONE: ICD-10-CM

## 2024-10-02 DIAGNOSIS — N20.1 LEFT URETERAL STONE: ICD-10-CM

## 2024-10-02 PROCEDURE — 52356 CYSTO/URETERO W/LITHOTRIPSY: CPT | Mod: LT,,, | Performed by: UROLOGY

## 2024-10-02 PROCEDURE — 37000009 HC ANESTHESIA EA ADD 15 MINS: Performed by: UROLOGY

## 2024-10-02 PROCEDURE — C1747 OPTIME ENDOSCOPE, SINGLE, URINARY TRACT: HCPCS | Performed by: UROLOGY

## 2024-10-02 PROCEDURE — 74420 UROGRAPHY RTRGR +-KUB: CPT | Mod: 26,,, | Performed by: UROLOGY

## 2024-10-02 PROCEDURE — 88300 SURGICAL PATH GROSS: CPT | Performed by: STUDENT IN AN ORGANIZED HEALTH CARE EDUCATION/TRAINING PROGRAM

## 2024-10-02 PROCEDURE — 25000003 PHARM REV CODE 250: Performed by: ANESTHESIOLOGY

## 2024-10-02 PROCEDURE — 27201423 OPTIME MED/SURG SUP & DEVICES STERILE SUPPLY: Performed by: UROLOGY

## 2024-10-02 PROCEDURE — 82365 CALCULUS SPECTROSCOPY: CPT | Performed by: UROLOGY

## 2024-10-02 PROCEDURE — 25000003 PHARM REV CODE 250: Performed by: NURSE ANESTHETIST, CERTIFIED REGISTERED

## 2024-10-02 PROCEDURE — C1894 INTRO/SHEATH, NON-LASER: HCPCS | Performed by: UROLOGY

## 2024-10-02 PROCEDURE — 36000706: Performed by: UROLOGY

## 2024-10-02 PROCEDURE — 63600175 PHARM REV CODE 636 W HCPCS: Performed by: NURSE ANESTHETIST, CERTIFIED REGISTERED

## 2024-10-02 PROCEDURE — 63600175 PHARM REV CODE 636 W HCPCS: Performed by: ANESTHESIOLOGY

## 2024-10-02 PROCEDURE — C1758 CATHETER, URETERAL: HCPCS | Performed by: UROLOGY

## 2024-10-02 PROCEDURE — C2617 STENT, NON-COR, TEM W/O DEL: HCPCS | Performed by: UROLOGY

## 2024-10-02 PROCEDURE — 37000008 HC ANESTHESIA 1ST 15 MINUTES: Performed by: UROLOGY

## 2024-10-02 PROCEDURE — 71000016 HC POSTOP RECOV ADDL HR: Performed by: UROLOGY

## 2024-10-02 PROCEDURE — C1769 GUIDE WIRE: HCPCS | Performed by: UROLOGY

## 2024-10-02 PROCEDURE — 36000707: Performed by: UROLOGY

## 2024-10-02 PROCEDURE — 71000015 HC POSTOP RECOV 1ST HR: Performed by: UROLOGY

## 2024-10-02 PROCEDURE — 71000033 HC RECOVERY, INTIAL HOUR: Performed by: UROLOGY

## 2024-10-02 PROCEDURE — 25500020 PHARM REV CODE 255: Performed by: UROLOGY

## 2024-10-02 PROCEDURE — 25000003 PHARM REV CODE 250: Performed by: UROLOGY

## 2024-10-02 DEVICE — STENT URETERAL UNIV 6FR 28CM: Type: IMPLANTABLE DEVICE | Site: URETER | Status: FUNCTIONAL

## 2024-10-02 RX ORDER — FENTANYL CITRATE 50 UG/ML
INJECTION, SOLUTION INTRAMUSCULAR; INTRAVENOUS
Status: DISCONTINUED | OUTPATIENT
Start: 2024-10-02 | End: 2024-10-02

## 2024-10-02 RX ORDER — HYDROCODONE BITARTRATE AND ACETAMINOPHEN 5; 325 MG/1; MG/1
1 TABLET ORAL EVERY 4 HOURS PRN
Status: CANCELLED | OUTPATIENT
Start: 2024-10-02

## 2024-10-02 RX ORDER — ONDANSETRON HYDROCHLORIDE 2 MG/ML
INJECTION, SOLUTION INTRAVENOUS
Status: DISCONTINUED | OUTPATIENT
Start: 2024-10-02 | End: 2024-10-02

## 2024-10-02 RX ORDER — MIDAZOLAM HYDROCHLORIDE 1 MG/ML
INJECTION INTRAMUSCULAR; INTRAVENOUS
Status: DISCONTINUED | OUTPATIENT
Start: 2024-10-02 | End: 2024-10-02

## 2024-10-02 RX ORDER — CIPROFLOXACIN 500 MG/1
500 TABLET ORAL EVERY 12 HOURS
Qty: 10 TABLET | Refills: 0 | Status: SHIPPED | OUTPATIENT
Start: 2024-10-02

## 2024-10-02 RX ORDER — MEPERIDINE HYDROCHLORIDE 25 MG/ML
12.5 INJECTION INTRAMUSCULAR; INTRAVENOUS; SUBCUTANEOUS ONCE AS NEEDED
Status: DISCONTINUED | OUTPATIENT
Start: 2024-10-02 | End: 2024-10-02 | Stop reason: HOSPADM

## 2024-10-02 RX ORDER — TAMSULOSIN HYDROCHLORIDE 0.4 MG/1
0.4 CAPSULE ORAL DAILY
Qty: 30 CAPSULE | Refills: 0 | Status: SHIPPED | OUTPATIENT
Start: 2024-10-02 | End: 2024-11-01

## 2024-10-02 RX ORDER — OXYCODONE AND ACETAMINOPHEN 5; 325 MG/1; MG/1
1 TABLET ORAL
Status: DISCONTINUED | OUTPATIENT
Start: 2024-10-02 | End: 2024-10-02 | Stop reason: HOSPADM

## 2024-10-02 RX ORDER — SODIUM CHLORIDE, SODIUM LACTATE, POTASSIUM CHLORIDE, CALCIUM CHLORIDE 600; 310; 30; 20 MG/100ML; MG/100ML; MG/100ML; MG/100ML
INJECTION, SOLUTION INTRAVENOUS CONTINUOUS PRN
Status: DISCONTINUED | OUTPATIENT
Start: 2024-10-02 | End: 2024-10-02

## 2024-10-02 RX ORDER — PHENAZOPYRIDINE HYDROCHLORIDE 200 MG/1
200 TABLET, FILM COATED ORAL 3 TIMES DAILY PRN
Qty: 15 TABLET | Refills: 0 | Status: SHIPPED | OUTPATIENT
Start: 2024-10-02 | End: 2024-10-07

## 2024-10-02 RX ORDER — HYDROMORPHONE HYDROCHLORIDE 1 MG/ML
0.2 INJECTION, SOLUTION INTRAMUSCULAR; INTRAVENOUS; SUBCUTANEOUS EVERY 5 MIN PRN
Status: DISCONTINUED | OUTPATIENT
Start: 2024-10-02 | End: 2024-10-02 | Stop reason: HOSPADM

## 2024-10-02 RX ORDER — ONDANSETRON HYDROCHLORIDE 2 MG/ML
4 INJECTION, SOLUTION INTRAVENOUS ONCE AS NEEDED
Status: DISCONTINUED | OUTPATIENT
Start: 2024-10-02 | End: 2024-10-02 | Stop reason: HOSPADM

## 2024-10-02 RX ORDER — ONDANSETRON HYDROCHLORIDE 2 MG/ML
4 INJECTION, SOLUTION INTRAVENOUS DAILY PRN
Status: DISCONTINUED | OUTPATIENT
Start: 2024-10-02 | End: 2024-10-02 | Stop reason: HOSPADM

## 2024-10-02 RX ORDER — DEXAMETHASONE SODIUM PHOSPHATE 4 MG/ML
INJECTION, SOLUTION INTRA-ARTICULAR; INTRALESIONAL; INTRAMUSCULAR; INTRAVENOUS; SOFT TISSUE
Status: DISCONTINUED | OUTPATIENT
Start: 2024-10-02 | End: 2024-10-02

## 2024-10-02 RX ORDER — HYDROCODONE BITARTRATE AND ACETAMINOPHEN 7.5; 325 MG/1; MG/1
1 TABLET ORAL EVERY 6 HOURS PRN
Qty: 15 TABLET | Refills: 0 | Status: SHIPPED | OUTPATIENT
Start: 2024-10-02

## 2024-10-02 RX ORDER — FENTANYL CITRATE 50 UG/ML
25 INJECTION, SOLUTION INTRAMUSCULAR; INTRAVENOUS EVERY 5 MIN PRN
Status: DISCONTINUED | OUTPATIENT
Start: 2024-10-02 | End: 2024-10-02 | Stop reason: HOSPADM

## 2024-10-02 RX ORDER — LIDOCAINE HYDROCHLORIDE 10 MG/ML
INJECTION, SOLUTION EPIDURAL; INFILTRATION; INTRACAUDAL; PERINEURAL
Status: DISCONTINUED | OUTPATIENT
Start: 2024-10-02 | End: 2024-10-02

## 2024-10-02 RX ORDER — PROPOFOL 10 MG/ML
VIAL (ML) INTRAVENOUS
Status: DISCONTINUED | OUTPATIENT
Start: 2024-10-02 | End: 2024-10-02

## 2024-10-02 RX ORDER — LIDOCAINE HYDROCHLORIDE 20 MG/ML
JELLY TOPICAL
Status: DISCONTINUED | OUTPATIENT
Start: 2024-10-02 | End: 2024-10-02 | Stop reason: HOSPADM

## 2024-10-02 RX ADMIN — SODIUM CHLORIDE, SODIUM LACTATE, POTASSIUM CHLORIDE, AND CALCIUM CHLORIDE: 600; 310; 30; 20 INJECTION, SOLUTION INTRAVENOUS at 01:10

## 2024-10-02 RX ADMIN — DEXAMETHASONE SODIUM PHOSPHATE 4 MG: 4 INJECTION, SOLUTION INTRA-ARTICULAR; INTRALESIONAL; INTRAMUSCULAR; INTRAVENOUS; SOFT TISSUE at 01:10

## 2024-10-02 RX ADMIN — CEFTRIAXONE SODIUM 1 G: 1 INJECTION, POWDER, FOR SOLUTION INTRAMUSCULAR; INTRAVENOUS at 01:10

## 2024-10-02 RX ADMIN — ONDANSETRON 4 MG: 2 INJECTION INTRAMUSCULAR; INTRAVENOUS at 01:10

## 2024-10-02 RX ADMIN — LIDOCAINE HYDROCHLORIDE 100 MG: 10 SOLUTION INTRAVENOUS at 01:10

## 2024-10-02 RX ADMIN — FENTANYL CITRATE 50 MCG: 50 INJECTION, SOLUTION INTRAMUSCULAR; INTRAVENOUS at 01:10

## 2024-10-02 RX ADMIN — MIDAZOLAM HYDROCHLORIDE 2 MG: 1 INJECTION, SOLUTION INTRAMUSCULAR; INTRAVENOUS at 01:10

## 2024-10-02 RX ADMIN — HYDROMORPHONE HYDROCHLORIDE 0.2 MG: 1 INJECTION, SOLUTION INTRAMUSCULAR; INTRAVENOUS; SUBCUTANEOUS at 03:10

## 2024-10-02 RX ADMIN — PROPOFOL 250 MG: 10 INJECTION, EMULSION INTRAVENOUS at 01:10

## 2024-10-02 RX ADMIN — OXYCODONE HYDROCHLORIDE AND ACETAMINOPHEN 1 TABLET: 5; 325 TABLET ORAL at 02:10

## 2024-10-02 NOTE — ANESTHESIA PREPROCEDURE EVALUATION
10/02/2024  Claudio Byrd is a 38 y.o., male.      Pre-op Assessment    I have reviewed the Patient Summary Reports.     I have reviewed the Nursing Notes. I have reviewed the NPO Status.      Review of Systems  Anesthesia Hx:  No problems with previous Anesthesia                Hematology/Oncology:  Hematology Normal   Oncology Normal                                   Renal/:  Renal/ Normal                 Hepatic/GI:  Hepatic/GI Normal                 Neurological:  Neurology Normal                                      Endocrine:  Endocrine Normal            Dermatological:  Skin Normal    Psych:  Psychiatric Normal                    Physical Exam  General: Well nourished, Cooperative, Alert and Oriented    Airway:  Mallampati: II / II  Mouth Opening: Normal  TM Distance: Normal  Tongue: Normal  Neck ROM: Normal ROM    Dental:  Intact    There is no problem list on file for this patient.        Anesthesia Plan  Type of Anesthesia, risks & benefits discussed:    Anesthesia Type: Gen ETT  Intra-op Monitoring Plan: Standard ASA Monitors  Post Op Pain Control Plan: multimodal analgesia  Induction:  IV  Airway Plan: Direct  Informed Consent: Informed consent signed with the Patient and all parties understand the risks and agree with anesthesia plan.  All questions answered.   ASA Score: 2  Day of Surgery Review of History & Physical: H&P Update referred to the surgeon/provider.I have interviewed and examined the patient. I have reviewed the patient's H&P dated: There are no significant changes. H&P completed by Anesthesiologist.    Ready For Surgery From Anesthesia Perspective.     .

## 2024-10-02 NOTE — OP NOTE
Date of Procedure: 10/02/2024    PREOPERATIVE DIAGNOSIS:  Left renal stone.                                                                                                           POSTOPERATIVE DIAGNOSIS:  same                               PROCEDURES:                                                                  1.  Left ureteroscopy with laser lithotripsy                    2.  Cystoscopy with placement left ureteral stent.      3. Cysto left retrograde pyelogram with radiologic interpretation            SURGEON:  Fei Varela MD                                      Assistants: None    Specimen:  None    ANESTHESIA:  General LMA                                FINDINGS: Left renal stone approx 4 mm                                   BLOOD LOSS:  None.                                                         DRAINS:  6 Northern Irish by 28 cm left  ureteral stent.                      PROCEDURE IN DETAIL:  After informed consent and preoperative antibiotics patient is brought to the operative suite.  After appropriate anesthetic patient was repositioned into dorsal lithotomy.  Genitalia prepped and draped sterilely.  21 Northern Irish rigid cystoscope was placed  through the urethra into the bladder. .  Brief examination revealed bladder was otherwise unremarkable.  Solitary left ureteral orifice was identified.Left ureteral orifice was readily cannulated with two  0.38 in glidewires.  Attempts at passing the flexible digital ureteroscope over a wire was unsuccessful.  A 11/13 ureteral access sheath was placed into the left upper collecting system.  The inner sheath was removed.  The flexible digital ureteroscope was passed to the level of the UPJ but it was fairly narrow.  Therefore I elected to change out to a longer ureteral access sheath that I could advanced to the level of the renal pelvis.  This was successful in getting into the renal pelvis.  I passed the scope into the renal pelvis where the brown colored stone was  readily identified.  I initially basketed this to try to bring it out with the ureteral access sheath however it slipped out after I basketed.  The decision was made to then proceed with laser lithotripsy.    A 272 micron holmium laser fiber was introduced and systematic lithotripsy was performed of the stone.  The stone was broken into at least 3 or 4 smaller fragments.  As I basketed a larger stone piece and brought it out when I replaced the ureteroscope there was noted that the access sheath had slipped out.  In visualizing the area where the access sheath has been placed there was noted to be some stretching of the ureter.  I was unable to pass the ureteroscope back into the kidney.  The ureteral access sheath was removed.  Using the safety wire I advanced a 5 Algerian open-ended catheter and did a retrograde pyelogram there was no extravasation.  But decision was made to place a 6 Algerian by 28 cm left ureteral stent.  The safety wire was used to place a 6 Algerian by 28 cm left ureteral stent.    Uro jet was injected per urethra.    Patient was awakened taken to recovery room in stable condition findings discussed with patient's family.    COMPLICATIONS:  None.

## 2024-10-02 NOTE — ANESTHESIA PROCEDURE NOTES
Intubation    Date/Time: 10/2/2024 1:14 PM    Performed by: Jeannette Kenyon CRNA  Authorized by: Linus Santoro MD    Intubation:     Induction:  Intravenous    Intubated:  Postinduction    Mask Ventilation:  Easy with oral airway    Attempts:  1    Attempted By:  CRNA    Difficult Airway Encountered?: No      Complications:  None    Airway Device:  Supraglottic airway/LMA    Airway Device Size:  4.0    Placement Verified By:  Capnometry    Complicating Factors:  None    Findings Post-Intubation:  BS equal bilateral and atraumatic/condition of teeth unchanged

## 2024-10-02 NOTE — TRANSFER OF CARE
"Anesthesia Transfer of Care Note    Patient: Claudio Byrd    Procedure(s) Performed: Procedure(s) (LRB):  CYSTOURETEROSCOPY, WITH HOLMIUM LASER LITHOTRIPSY OF URETERAL CALCULUS AND STENT INSERTION (Left)  EXTRACTION - STONE (Left)  PYELOGRAM, RETROGRADE (Left)    Patient location: PACU    Anesthesia Type: general    Transport from OR: Transported from OR on room air with adequate spontaneous ventilation    Post pain: adequate analgesia    Post assessment: no apparent anesthetic complications    Post vital signs: stable    Level of consciousness: responds to stimulation    Nausea/Vomiting: no nausea/vomiting    Complications: none    Transfer of care protocol was followed      Last vitals: Visit Vitals  BP (!) 150/76   Pulse 74   Temp 36.8 °C (98.2 °F) (Temporal)   Resp 18   Ht 5' 11" (1.803 m)   Wt 133.6 kg (294 lb 6.8 oz)   SpO2 100%   BMI 41.06 kg/m²     "

## 2024-10-02 NOTE — TELEPHONE ENCOUNTER
----- Message from Fei Varela MD sent at 10/2/2024  2:17 PM CDT -----  Regarding: f/u please  F/u 2 weeks for possible cysto stent removal.

## 2024-10-03 LAB
FINAL PATHOLOGIC DIAGNOSIS: NORMAL
GROSS: NORMAL
Lab: NORMAL
SPECIMEN SOURCE: NORMAL

## 2024-10-10 NOTE — ANESTHESIA POSTPROCEDURE EVALUATION
Anesthesia Post Evaluation    Patient: Claudio Byrd    Procedure(s) Performed: Procedure(s) (LRB):  CYSTOURETEROSCOPY, WITH HOLMIUM LASER LITHOTRIPSY OF URETERAL CALCULUS AND STENT INSERTION (Left)  EXTRACTION - STONE (Left)  PYELOGRAM, RETROGRADE (Left)    Final Anesthesia Type: general      Patient location during evaluation: PACU  Patient participation: Yes- Able to Participate  Level of consciousness: awake and alert, oriented and awake  Post-procedure vital signs: reviewed and stable  Pain management: adequate  Airway patency: patent    PONV status at discharge: No PONV  Anesthetic complications: no      Cardiovascular status: blood pressure returned to baseline  Respiratory status: unassisted  Hydration status: euvolemic  Follow-up not needed.              Vitals Value Taken Time   /91 10/02/24 1500   Temp 36.8 °C (98.2 °F) 10/02/24 1415   Pulse 75 10/02/24 1500   Resp 16 10/02/24 1502   SpO2 98 % 10/02/24 1500         Event Time   Out of Recovery 14:52:01         Pain/Akbar Score: No data recorded

## 2024-10-21 ENCOUNTER — PROCEDURE VISIT (OUTPATIENT)
Dept: UROLOGY | Facility: CLINIC | Age: 39
End: 2024-10-21
Payer: COMMERCIAL

## 2024-10-21 VITALS
SYSTOLIC BLOOD PRESSURE: 139 MMHG | BODY MASS INDEX: 41.06 KG/M2 | RESPIRATION RATE: 18 BRPM | HEART RATE: 92 BPM | HEIGHT: 71 IN | TEMPERATURE: 97 F | DIASTOLIC BLOOD PRESSURE: 97 MMHG

## 2024-10-21 DIAGNOSIS — T83.592A: ICD-10-CM

## 2024-10-21 DIAGNOSIS — N20.0 LEFT RENAL STONE: Primary | ICD-10-CM

## 2024-10-21 PROCEDURE — 96372 THER/PROPH/DIAG INJ SC/IM: CPT | Mod: 59,S$GLB,, | Performed by: UROLOGY

## 2024-10-21 PROCEDURE — 87086 URINE CULTURE/COLONY COUNT: CPT | Performed by: UROLOGY

## 2024-10-21 PROCEDURE — 52310 CYSTOSCOPY AND TREATMENT: CPT | Mod: S$GLB,,, | Performed by: UROLOGY

## 2024-10-21 RX ORDER — LIDOCAINE HYDROCHLORIDE 20 MG/ML
JELLY TOPICAL
Status: SHIPPED | OUTPATIENT
Start: 2024-10-21

## 2024-10-21 RX ORDER — CEFTRIAXONE 1 G/1
1 INJECTION, POWDER, FOR SOLUTION INTRAMUSCULAR; INTRAVENOUS
Status: COMPLETED | OUTPATIENT
Start: 2024-10-21 | End: 2024-10-21

## 2024-10-21 RX ADMIN — CEFTRIAXONE 1 G: 1 INJECTION, POWDER, FOR SOLUTION INTRAMUSCULAR; INTRAVENOUS at 10:10

## 2024-10-21 NOTE — PROCEDURES
Cystoscopy w/ stent removal    Date/Time: 10/21/2024 10:30 AM    Performed by: Fei Varela MD  Authorized by: Fei Varela MD  Local anesthesia used: yes    Anesthesia:  Local anesthesia used: yes  Local Anesthetic: topical anesthetic    Sedation:  Patient sedated: no    Patient tolerance: patient tolerated the procedure well with no immediate complications  Comments: Patient was prepped and draped sterilely.  Lidocaine jelly was injected per urethra.  A 17 Filipino flexible cystoscope was passed through the urethra into the bladder.  The stent was readily identified and grasped with a foreign body grasper.  This was removed intact.    We will see him back in 2 weeks to re-evaluate.

## 2024-10-21 NOTE — PROGRESS NOTES
.Per Dr. Varela IM rocephin 1 G given to pt via right dorsal-gluteal using aseptic technique. Pt tolerated well. Pt instructed to remain in clinic for 15 minutes after injection to monitor for signs & symptoms of adverse reaction. Pt verbalized understanding.      Loraine Milanes RN

## 2024-10-23 LAB — BACTERIA UR CULT: NO GROWTH

## 2024-11-05 ENCOUNTER — HOSPITAL ENCOUNTER (OUTPATIENT)
Dept: RADIOLOGY | Facility: HOSPITAL | Age: 39
Discharge: HOME | End: 2024-11-05
Attending: UROLOGY
Payer: COMMERCIAL

## 2024-11-05 ENCOUNTER — OFFICE VISIT (OUTPATIENT)
Dept: UROLOGY | Facility: CLINIC | Age: 39
End: 2024-11-05
Payer: COMMERCIAL

## 2024-11-05 VITALS
RESPIRATION RATE: 16 BRPM | SYSTOLIC BLOOD PRESSURE: 120 MMHG | BODY MASS INDEX: 41.24 KG/M2 | HEIGHT: 71 IN | WEIGHT: 294.56 LBS | HEART RATE: 110 BPM | DIASTOLIC BLOOD PRESSURE: 80 MMHG

## 2024-11-05 DIAGNOSIS — Q60.0 SOLITARY KIDNEY, CONGENITAL: ICD-10-CM

## 2024-11-05 DIAGNOSIS — N20.0 LEFT RENAL STONE: Primary | ICD-10-CM

## 2024-11-05 DIAGNOSIS — N20.0 LEFT RENAL STONE: ICD-10-CM

## 2024-11-05 PROCEDURE — 74018 RADEX ABDOMEN 1 VIEW: CPT | Mod: 26,,, | Performed by: RADIOLOGY

## 2024-11-05 PROCEDURE — 74018 RADEX ABDOMEN 1 VIEW: CPT | Mod: TC

## 2024-11-05 PROCEDURE — 99999 PR PBB SHADOW E&M-EST. PATIENT-LVL III: CPT | Mod: PBBFAC,,, | Performed by: UROLOGY

## 2024-11-05 PROCEDURE — 99214 OFFICE O/P EST MOD 30 MIN: CPT | Mod: S$GLB,,, | Performed by: UROLOGY

## 2024-11-05 NOTE — PROGRESS NOTES
Chief Complaint:   Encounter Diagnosis   Name Primary?    Left renal stone Yes       HPI:  HPI Claudio Byrd ann 38 y.o. male who presents with follow up to left ureteroscopy in his solitary kidney.  He had a stent removed 2 weeks ago.  He has been doing well.  Not having any flank pain or hematuria.  He has resumed full activity.  He inquires about stone prevention.  He did have a stone analysis showing 100% calcium oxalate monohydrate stone.    History:  Social History     Tobacco Use    Smoking status: Never   Substance Use Topics    Alcohol use: Yes    Drug use: Never     No past medical history on file.  Past Surgical History:   Procedure Laterality Date    CYSTOURETEROSCOPY, WITH HOLMIUM LASER LITHOTRIPSY OF URETERAL CALCULUS AND STENT INSERTION Left 10/2/2024    Procedure: CYSTOURETEROSCOPY, WITH HOLMIUM LASER LITHOTRIPSY OF URETERAL CALCULUS AND STENT INSERTION;  Surgeon: Fei Varela MD;  Location: [facility] OR;  Service: Urology;  Laterality: Left;    EXTRACTION - STONE Left 10/2/2024    Procedure: EXTRACTION - STONE;  Surgeon: Fei Varela MD;  Location: [facility] OR;  Service: Urology;  Laterality: Left;    EXTRACTION OF TOOTH      EYE SURGERY      RETROGRADE PYELOGRAPHY Left 10/2/2024    Procedure: PYELOGRAM, RETROGRADE;  Surgeon: Fei Varela MD;  Location: [facility] OR;  Service: Urology;  Laterality: Left;     No family history on file.    Current Outpatient Medications on File Prior to Visit   Medication Sig Dispense Refill    tamsulosin (FLOMAX) 0.4 mg Cap Take 1 capsule (0.4 mg total) by mouth once daily. 30 capsule 0    [DISCONTINUED] ciprofloxacin HCl (CIPRO) 500 MG tablet Take 1 tablet (500 mg total) by mouth every 12 (twelve) hours. (Patient not taking: Reported on 11/5/2024) 10 tablet 0    [DISCONTINUED] HYDROcodone-acetaminophen (NORCO) 7.5-325 mg per tablet Take 1 tablet by mouth every 6 (six) hours as needed for Pain. (Patient not taking: Reported on 11/5/2024) 15 tablet 0  "    Current Facility-Administered Medications on File Prior to Visit   Medication Dose Route Frequency Provider Last Rate Last Admin    LIDOcaine HCl 2% urojet   Mucous Membrane 1 time in Clinic/HOD             Objective:     Vitals:    11/05/24 0959   BP: 120/80   BP Location: Left arm   Patient Position: Sitting   Pulse: 110   Resp: 16   Weight: 133.6 kg (294 lb 8.6 oz)   Height: 5' 11" (1.803 m)      BMI Readings from Last 1 Encounters:   11/05/24 41.08 kg/m²          Physical Exam    No acute distress alert and oriented   Respirations even unlabored   Abdomen is soft nontender     Urinalysis clear    Lab Results   Component Value Date    CREATININE 1.2 08/31/2024      Assessment:       1. Left renal stone        Plan:     1. Left renal stone       Orders Placed This Encounter    X-Ray Abdomen AP 1 View    X-Ray Abdomen AP 1 View      Left renal stone status post ureteroscopy.  Patient was doing well currently.  We will get KUB today and then repeat KUB in 1 year as he has a solitary left kidney.  "

## 2024-12-12 ENCOUNTER — OFFICE VISIT (OUTPATIENT)
Dept: URGENT CARE | Facility: CLINIC | Age: 39
End: 2024-12-12
Payer: COMMERCIAL

## 2024-12-12 VITALS
TEMPERATURE: 98 F | BODY MASS INDEX: 43.27 KG/M2 | SYSTOLIC BLOOD PRESSURE: 143 MMHG | RESPIRATION RATE: 12 BRPM | HEIGHT: 71 IN | DIASTOLIC BLOOD PRESSURE: 85 MMHG | HEART RATE: 101 BPM | OXYGEN SATURATION: 98 % | WEIGHT: 309.06 LBS

## 2024-12-12 DIAGNOSIS — J01.90 ACUTE NON-RECURRENT SINUSITIS, UNSPECIFIED LOCATION: ICD-10-CM

## 2024-12-12 DIAGNOSIS — R05.1 ACUTE COUGH: ICD-10-CM

## 2024-12-12 DIAGNOSIS — R11.0 NAUSEA: ICD-10-CM

## 2024-12-12 DIAGNOSIS — R50.9 FEVER, UNSPECIFIED FEVER CAUSE: ICD-10-CM

## 2024-12-12 DIAGNOSIS — R09.81 SINUS CONGESTION: ICD-10-CM

## 2024-12-12 DIAGNOSIS — H65.02 NON-RECURRENT ACUTE SEROUS OTITIS MEDIA OF LEFT EAR: Primary | ICD-10-CM

## 2024-12-12 DIAGNOSIS — R09.89 DECREASED BREATH SOUNDS OF BOTH LUNGS: ICD-10-CM

## 2024-12-12 LAB
CTP QC/QA: YES
POC MOLECULAR INFLUENZA A AGN: NEGATIVE
POC MOLECULAR INFLUENZA B AGN: NEGATIVE

## 2024-12-12 PROCEDURE — 87502 INFLUENZA DNA AMP PROBE: CPT | Mod: QW,S$GLB,, | Performed by: NURSE PRACTITIONER

## 2024-12-12 PROCEDURE — 99203 OFFICE O/P NEW LOW 30 MIN: CPT | Mod: S$GLB,,, | Performed by: NURSE PRACTITIONER

## 2024-12-12 RX ORDER — AZITHROMYCIN 250 MG/1
TABLET, FILM COATED ORAL
Qty: 6 TABLET | Refills: 0 | Status: SHIPPED | OUTPATIENT
Start: 2024-12-12

## 2024-12-12 RX ORDER — ALBUTEROL SULFATE 90 UG/1
2 INHALANT RESPIRATORY (INHALATION) EVERY 4 HOURS PRN
Qty: 8 G | Refills: 0 | Status: SHIPPED | OUTPATIENT
Start: 2024-12-12 | End: 2025-01-11

## 2024-12-12 RX ORDER — IBUPROFEN 800 MG/1
800 TABLET ORAL 3 TIMES DAILY
Qty: 30 TABLET | Refills: 0 | Status: SHIPPED | OUTPATIENT
Start: 2024-12-12 | End: 2024-12-22

## 2024-12-12 NOTE — PATIENT INSTRUCTIONS
Sinusitis Discharge Instructions, Adult     What care is needed at home?   Ask your doctor what you need to do when you go home. Make sure you ask questions if you do not understand what you need to do.  Try to thin the mucus.  Drink lots of liquids to stay hydrated.  Use a cool mist humidifier to avoid dry air.  Use saline nose drops or a saline nose rinse to relieve stuffiness.  Wash your hands often. This will help keep others healthy.  Do not smoke or be in smoke-filled places. Avoid things that may cause breathing problems like fumes, pollution, dust, and other common allergens and irritants.  You may want to take medicine like ibuprofen, naproxen, or acetaminophen to help with pain.    Urgent Care Discharge Information    If you have been discharged from the clinic prior to your point of care test results being completed, please make sure to check your [employer] account.  If there is a change in treatment, we will communicate with you through here.  If your test is positive, and medications are ordered, these will be sent to your preferred pharmacy.   If your test is negative, no further steps needed. If you do not hear from us or have questions, please call the clinic.        - You must understand that you have received an Urgent Care treatment only and that you may be released before all of your medical problems are known or treated.   - You, the patient, will arrange for follow up care as instructed with your primary care provider or recommended specialist.   -  Please arrange follow up with your primary medical clinic as soon as possible.   - If your condition worsens or fails to improve we recommend that you receive another evaluation at the ER immediately or contact your PCP to discuss your concerns, or return here.   - Please do not drive or make any important decisions for 24 hours if you have received any pain medications, sedatives or mood altering drugs during your visit.    WE CANNOT RULE OUT ALL  POSSIBLE CAUSES OF YOUR SYMPTOMS IN THE URGENT CARE SETTING.  PLEASE GO TO THE ER IF YOU FEELS YOUR CONDITION IS WORSENING OR YOU WOULD LIKE EMERGENT EVALUATION.  GO TO THE EMERGENCY DEPARTMENT FOR ANY NEW OR WORSENING SYMPTOMS INCLUDING:  WORSENING ABDOMINAL PAIN, DARK/BLACK/BLOODY BOWEL MOVEMENTS, VOMITING BLOOD, HARD ABDOMEN, FEVER, CHEST PAIN, SHORTNESS OF BREATH, LOSS OF CONSCIOUSNESS, OR ANY OTHER CONCERN.

## 2024-12-12 NOTE — PROGRESS NOTES
"Subjective:      Patient ID: Claudio Byrd is a 39 y.o. male.    Vitals:  height is 5' 11" (1.803 m) and weight is 140.2 kg (309 lb 1.4 oz) (abnormal). His axillary temperature is 97.7 °F (36.5 °C). His blood pressure is 143/85 (abnormal) and his pulse is 101. His respiration is 12 and oxygen saturation is 98%.     Chief Complaint: Fever    Pt presents with cough, congestion, and fever ongoing 3 days 12/9/2024. Pt states he started with a cough and congestion on Monday 12/9/2024, and just started feeling feverish with 102 fever last night. Pt states his cough is worst at night, and feels pain in his chest hen he coughs. Pt taking ibuprofen for sxs, last taken at 6:30am this morning     Fever   This is a new problem. The current episode started 3 days ago. The problem occurs constantly. The problem has been unchanged. He has not experienced a heat injury.The maximum temperature noted was 102 to 102.9 F. The temperature was taken using an axillary reading. Associated symptoms include congestion, coughing, headaches, muscle aches, nausea and wheezing. He has tried NSAIDs for the symptoms. The treatment provided no relief.       Constitution: Positive for fever.   HENT:  Positive for congestion.    Respiratory:  Positive for cough and wheezing.    Gastrointestinal:  Positive for nausea.   Neurological:  Positive for headaches.      Objective:     Physical Exam   Constitutional: He is oriented to person, place, and time. He appears well-developed. He is cooperative.  Non-toxic appearance. He does not appear ill. No distress.   HENT:   Head: Normocephalic and atraumatic.   Ears:   Right Ear: Hearing, external ear and ear canal normal. Tympanic membrane is injected, erythematous and bulging. A middle ear effusion is present.   Left Ear: Hearing, external ear and ear canal normal. Tympanic membrane is erythematous. A middle ear effusion is present.   Nose: Mucosal edema and rhinorrhea present. No nasal " deformity. No epistaxis. Right sinus exhibits maxillary sinus tenderness. Right sinus exhibits no frontal sinus tenderness. Left sinus exhibits maxillary sinus tenderness. Left sinus exhibits no frontal sinus tenderness.   Mouth/Throat: Uvula is midline and mucous membranes are normal. No trismus in the jaw. Normal dentition. Uvula swelling present. Posterior oropharyngeal edema and posterior oropharyngeal erythema present. No oropharyngeal exudate.   Eyes: Conjunctivae and lids are normal. No scleral icterus.   Neck: Trachea normal and phonation normal. Neck supple. No edema present. No erythema present. No neck rigidity present.   Cardiovascular: Normal rate, regular rhythm, normal heart sounds and normal pulses.   Pulmonary/Chest: Effort normal. No respiratory distress. He has decreased breath sounds. He has no rhonchi.   Patient reports intermittent wheezing.         Comments: Patient reports intermittent wheezing.    Abdominal: Normal appearance.   Musculoskeletal: Normal range of motion.         General: No deformity. Normal range of motion.   Lymphadenopathy:        Head (right side): Submandibular and tonsillar adenopathy present.        Head (left side): Submandibular and tonsillar adenopathy present.   Neurological: He is alert and oriented to person, place, and time. He exhibits normal muscle tone. Coordination normal.   Skin: Skin is warm, dry, intact, not diaphoretic and not pale.   Psychiatric: His speech is normal and behavior is normal. Judgment and thought content normal.   Nursing note and vitals reviewed.      Assessment:     1. Non-recurrent acute serous otitis media of left ear    2. Acute non-recurrent sinusitis, unspecified location    3. Fever, unspecified fever cause    4. Acute cough    5. Sinus congestion    6. Nausea    7. Decreased breath sounds of both lungs        Plan:     Results for orders placed or performed in visit on 12/12/24   POCT Influenza A/B MOLECULAR    Collection Time:  12/12/24 11:03 AM   Result Value Ref Range    POC Molecular Influenza A Ag Negative Negative    POC Molecular Influenza B Ag Negative Negative     Acceptable Yes          Non-recurrent acute serous otitis media of left ear  -     azithromycin (Z-KUMAR) 250 MG tablet; Take two tablets on day one and take one tablet daily for the next four days.  Zpack  Dispense: 6 tablet; Refill: 0    Acute non-recurrent sinusitis, unspecified location  -     azithromycin (Z-KUMAR) 250 MG tablet; Take two tablets on day one and take one tablet daily for the next four days.  Zpack  Dispense: 6 tablet; Refill: 0  -     albuterol (PROVENTIL/VENTOLIN HFA) 90 mcg/actuation inhaler; Inhale 2 puffs into the lungs every 4 (four) hours as needed for Wheezing.  Dispense: 8 g; Refill: 0    Fever, unspecified fever cause  -     POCT Influenza A/B MOLECULAR  -     azithromycin (Z-KUMAR) 250 MG tablet; Take two tablets on day one and take one tablet daily for the next four days.  Zpack  Dispense: 6 tablet; Refill: 0  -     ibuprofen (ADVIL,MOTRIN) 800 MG tablet; Take 1 tablet (800 mg total) by mouth 3 (three) times daily. for 10 days  Dispense: 30 tablet; Refill: 0    Acute cough  -     POCT Influenza A/B MOLECULAR    Sinus congestion  -     POCT Influenza A/B MOLECULAR  -     albuterol (PROVENTIL/VENTOLIN HFA) 90 mcg/actuation inhaler; Inhale 2 puffs into the lungs every 4 (four) hours as needed for Wheezing.  Dispense: 8 g; Refill: 0    Nausea    Decreased breath sounds of both lungs  -     albuterol (PROVENTIL/VENTOLIN HFA) 90 mcg/actuation inhaler; Inhale 2 puffs into the lungs every 4 (four) hours as needed for Wheezing.  Dispense: 8 g; Refill: 0      Sinusitis Discharge Instructions, Adult     What care is needed at home?   Ask your doctor what you need to do when you go home. Make sure you ask questions if you do not understand what you need to do.  Try to thin the mucus.  Drink lots of liquids to stay hydrated.  Use a cool mist  humidifier to avoid dry air.  Use saline nose drops or a saline nose rinse to relieve stuffiness.  Wash your hands often. This will help keep others healthy.  Do not smoke or be in smoke-filled places. Avoid things that may cause breathing problems like fumes, pollution, dust, and other common allergens and irritants.  You may want to take medicine like ibuprofen, naproxen, or acetaminophen to help with pain.

## 2025-01-12 RX ORDER — AMOXICILLIN AND CLAVULANATE POTASSIUM 875; 125 MG/1; MG/1
1 TABLET, FILM COATED ORAL EVERY 12 HOURS
Qty: 20 TABLET | Refills: 0 | Status: SHIPPED | OUTPATIENT
Start: 2025-01-12

## 2025-01-12 RX ORDER — METHYLPREDNISOLONE 4 MG/1
TABLET ORAL
Qty: 1 EACH | Refills: 0 | Status: SHIPPED | OUTPATIENT
Start: 2025-01-12 | End: 2025-02-02

## (undated) DEVICE — EXTRACTOR STNE 1.7FRX115CM

## (undated) DEVICE — SYR ONLY LUER LOCK 20CC

## (undated) DEVICE — TRAY SKIN SCRUB WET 4 COMPART

## (undated) DEVICE — SHEATH FLEXOR URET 10.7FRX35CM

## (undated) DEVICE — URETEROSCOPE LITHOVUE STANDARD

## (undated) DEVICE — SOL IRR WATER STRL 3000 ML

## (undated) DEVICE — UROVIEW 2600/2800

## (undated) DEVICE — KIT IRRIGATION PUMP SGL ACTION

## (undated) DEVICE — GOWN POLY REINF X-LONG XL

## (undated) DEVICE — SOL IRRI STRL WATER 1000ML

## (undated) DEVICE — CANISTER SUCTION JUMBO 12L

## (undated) DEVICE — CONTAINER SPECIMEN OR STER 4OZ

## (undated) DEVICE — IRRIGATION SET Y-TYPE TUR/BLAD

## (undated) DEVICE — WIRE GUID STAND STR .038X150CM

## (undated) DEVICE — TOWEL OR DISP STRL BLUE 4/PK

## (undated) DEVICE — BOWL STERILE LARGE 32OZ

## (undated) DEVICE — SPONGE COTTON TRAY 4X4IN

## (undated) DEVICE — CATH POLLACK OPEN-END FLEXI-TI

## (undated) DEVICE — SHEATH FLEXOR URETERAL 45CM

## (undated) DEVICE — GOWN POLY REINF BRTH SLV XL

## (undated) DEVICE — FIBER QUANTA OPT SDT 272UM

## (undated) DEVICE — DRAPE T CYSTOSCOPY STERILE

## (undated) DEVICE — GLOVE SIGNATURE ESSNTL LTX 7.5